# Patient Record
Sex: FEMALE | Race: WHITE | NOT HISPANIC OR LATINO | URBAN - METROPOLITAN AREA
[De-identification: names, ages, dates, MRNs, and addresses within clinical notes are randomized per-mention and may not be internally consistent; named-entity substitution may affect disease eponyms.]

---

## 2017-03-16 ENCOUNTER — HISTORICAL (OUTPATIENT)
Dept: LAB | Facility: HOSPITAL | Age: 32
End: 2017-03-16

## 2022-04-07 ENCOUNTER — HISTORICAL (OUTPATIENT)
Dept: ADMINISTRATIVE | Facility: HOSPITAL | Age: 37
End: 2022-04-07

## 2022-04-23 VITALS
WEIGHT: 145.75 LBS | DIASTOLIC BLOOD PRESSURE: 78 MMHG | SYSTOLIC BLOOD PRESSURE: 106 MMHG | HEIGHT: 64 IN | BODY MASS INDEX: 24.88 KG/M2

## 2023-08-26 ENCOUNTER — HOSPITAL ENCOUNTER (EMERGENCY)
Facility: HOSPITAL | Age: 38
Discharge: HOME | End: 2023-08-26
Attending: STUDENT IN AN ORGANIZED HEALTH CARE EDUCATION/TRAINING PROGRAM

## 2023-08-26 VITALS
TEMPERATURE: 99 F | DIASTOLIC BLOOD PRESSURE: 115 MMHG | RESPIRATION RATE: 24 BRPM | OXYGEN SATURATION: 98 % | HEART RATE: 115 BPM | SYSTOLIC BLOOD PRESSURE: 186 MMHG

## 2023-08-26 DIAGNOSIS — F43.0 ACUTE REACTION TO SITUATIONAL STRESS: Primary | ICD-10-CM

## 2023-08-26 DIAGNOSIS — F32.9 REACTIVE DEPRESSION: ICD-10-CM

## 2023-08-26 PROCEDURE — 99283 EMERGENCY DEPT VISIT LOW MDM: CPT

## 2023-08-26 NOTE — ED PROVIDER NOTES
Encounter Date: 8/26/2023       History     Chief Complaint   Patient presents with    Psychiatric Evaluation     Arrived per [facility] awake alert emotional crying states she freaked out because her boyfriend broke up with her reported per EMS she stated to [facility] she wanted to harm herself and a knife was at the scene patient denies SI/HI     38Year old female presents via EMS after argument with her boyfriend states that he broke up with her abruptly.  She told [facility] so that she wanted to harm herself apparently a knife was at the scene.  However states now that she did not mean it she was very upset.  Patient is tearful and emotional on exam.  States that she just got a job at a  has 2 children that she loves and was never harm herself.  Has no psych history and medical record and denies taking any psych medications.  Denies SI/HI/AVH.  Patient A&O x4      Review of patient's allergies indicates:  No Known Allergies  No past medical history on file.  No past surgical history on file.  No family history on file.     Review of Systems   Constitutional:  Negative for fever.   HENT:  Negative for sore throat.    Respiratory:  Negative for shortness of breath.    Cardiovascular:  Negative for chest pain.   Gastrointestinal:  Negative for nausea.   Genitourinary:  Negative for dysuria.   Musculoskeletal:  Negative for back pain.   Skin:  Negative for rash.   Neurological:  Negative for weakness.   Hematological:  Does not bruise/bleed easily.   Psychiatric/Behavioral:          Neg except as stated in HPI       Physical Exam     Initial Vitals [08/26/23 0647]   BP Pulse Resp Temp SpO2   (!) 186/115 (!) 115 (!) 24 98.6 °F (37 °C) 98 %      MAP       --         Physical Exam    Nursing note and vitals reviewed.  Constitutional: She appears well-developed and well-nourished.   Tearful /emotional   HENT:   Head: Normocephalic.   Eyes: EOM are normal. Pupils are equal, round, and reactive to light.   Neck:   Normal range  of motion.  Cardiovascular:  Normal pulses.           tachycardic   Pulmonary/Chest: Breath sounds normal. No respiratory distress.   Musculoskeletal:         General: Normal range of motion.      Cervical back: Normal range of motion.     Neurological: She is alert and oriented to person, place, and time. GCS score is 15. GCS eye subscore is 4. GCS verbal subscore is 5. GCS motor subscore is 6.   Skin: Skin is warm. Capillary refill takes less than 2 seconds.         ED Course   Procedures  Labs Reviewed - No data to display       Imaging Results    None          Medications - No data to display  Medical Decision Making  Amount and/or Complexity of Data Reviewed  Independent Historian: EMS     Details: Discussed situation with boyfriend Beto who states that patient was very upset due to him breaking up with her earlier this morning due to him thinking that she may have been unfaithful.  Patient did state that she was so upset that she would kill herself but he does not believe that she truly mental.  She immediately started talking about her kids when he said he was going to call the ambulance and she knows that she is all that they have.  Has 2 children and also a step child that she cares for.  He does think that she has a gun in the home who we will remove when he goes to bring her to get her car and follow her home.  Patient does not have any family or friends in the area although ex-boyfriend states that he will continue to check on her.  External Data Reviewed:      Details: Previous chart notes reviewed, no previous psych history/medications  Discussion of management or test interpretation with external provider(s): Patient is tearful and sad however denies SI/HI/AVH I do not believe patient to be in any acute danger to herself or others or gravely disabled.  Patient's appears to be of sound mind and able to make decisions for herself.  States that she just got a job as a  has 2 kids that she  cares of cares for her nose that stayed on have anyone else who would take care of them if she were to not be able to.  Patient regrets saying which she said does not state exactly words that were used.  Denies having any weapons in the home but advised her that I did tell her ex-boyfriend Beto that he should remove any guns or other weapons from the home at this time.  Also advised patient to follow up with and obtained PCP because she states she only has an OBGYN.  Strict ER precautions reviewed.  Patient understands all questions concerns addressed to the patient's satisfaction  Do not believe patient to meet pec criteria at this time    Risk  Decision regarding hospitalization.  Risk Details: As above                               Clinical Impression:   Final diagnoses:  [F43.0] Acute reaction to situational stress (Primary)  [F32.9] Reactive depression        ED Disposition Condition    Discharge Stable          ED Prescriptions    None       Follow-up Information       Follow up With Specialties Details Why Contact Info    PCP  Schedule an appointment as soon as possible for a visit                Shauna Santos MD  08/26/23 3122